# Patient Record
Sex: FEMALE | Race: WHITE | Employment: OTHER | URBAN - METROPOLITAN AREA
[De-identification: names, ages, dates, MRNs, and addresses within clinical notes are randomized per-mention and may not be internally consistent; named-entity substitution may affect disease eponyms.]

---

## 2021-04-01 PROBLEM — M79.671 RIGHT FOOT PAIN: Status: ACTIVE | Noted: 2021-04-01

## 2021-11-16 ENCOUNTER — HOSPITAL ENCOUNTER (OUTPATIENT)
Dept: PHYSICAL THERAPY | Age: 46
Discharge: HOME | End: 2021-11-16
Payer: COMMERCIAL

## 2021-11-16 PROCEDURE — 97530 THERAPEUTIC ACTIVITIES: CPT

## 2021-11-16 PROCEDURE — 97161 PT EVAL LOW COMPLEX 20 MIN: CPT

## 2021-11-16 PROCEDURE — 97110 THERAPEUTIC EXERCISES: CPT

## 2021-11-16 NOTE — PROGRESS NOTES
April Ouzts  : 1975  Primary: Keara Rodney Of [facility] Casillas*  Secondary:  [facility] at [address], [address], [place] [address].  [phone]   [phone]        OUTPATIENT PHYSICAL THERAPY: Daily Treatment Note  Visit Count:  1    ICD-10: Treatment Diagnosis: Pelvic and perineal pain (R10.2)  Muscle weakness (generalized) (M62.81)  Mixed incontinence (N39.46)  Low back pain (M54.5)    MEDICAL/REFERRING DIAGNOSIS:  Mixed incontinence [N39.46]  PFD (pelvic floor dysfunction) [M62.89]   REFERRING PHYSICIAN: Justin Horn,*  Pre-treatment Symptoms/Complaints:  See initial evaluation    Pain: Initial: 0/10 Post Session:  0/10   Medications Last Reviewed:  21   Initial Subjective and pertinent information:    PFM heaviness 3-/5 3 sec slow release  ttp bilat OI, adductors, internal endopelvic fascia,LA     Updated Objective Findings:  see eval   TREATMENT:   THERAPEUTIC EXERCISE: ( 15 minutes):  Exercises per grid below to improve strength and coordination. Required minimal verbal and tactile cues to promote proper body mechanics and promote proper body breathing techniques. Progressed resistance and repetitions as indicated.     Date:  21    Activity/Exercise Parameters   Diaphragmatic breathing With tactile feedback   drops With tactile feedback   Modified happy baby With DB               Pt ed       THERAPEUTIC ACTIVITY: ( 20 minutes): Functional activity education regarding anatomy, pathology and role of pelvic floor muscle (PFM) function in relation to presenting symptoms and role of pelvic floor therapy in conservative treatment., Instruction on coordinated pelvic floor and diaphragmatic breathing to improve kinesthetic awareness of pelvic muscle mobility and restore proper motor recruitment patterns with breathing, posture, and functional movement (e.g. appropriate lift/contraction with increased IAP such as a cough, laugh, sneeze to prevent incontinence as well as appropriate relaxation/drop to reduce pain with vaginal penetration). and briefly discussed use of squatty potty and no breath holding during bowel movement  TA Educational Topic Notes Date Completed   Pathology/Anatomy/PFM Function     Bladder health education     Urinary urge suppression     The rivka     Voiding strategies     Bowel/Bladder log     Bowel health education     Constipation care     Diarrhea/Fecal leakage     Colonic massage     Toilet positioning     Defecation dynamics     Sources of fiber     Return to intercourse/Dilator training     Sexual positioning     Lubricants/vaginal moisturizers     Vaginal irritants     Body mechanics     Posture/ergonomics     Diaphragmatic breathing     Resources and technology        MANUAL THERAPY: (  minutes):  to reduce tone and improve tissue elasticity. Pt gives verbal consent to internal vaginal assessment/treatment no chaperon present.  -   (Used abbreviations: MET - muscle energy technique; SCS- Strain counter strain; CTM- Connective tissue mobilizations; CR- Contract/relax; SP- Sustained pressure, TrP- Trigger point release, IASTM- Instrument assisted soft tissue mobilizations, TDN- Trigger point dry needling, DB - diaphragmatic breathing, PFM - pelvic floor muscles, OI - obturator internus, IC - iliococcygeus, PC - pubococcygeus, DTP - deep transverse perineum. HEP RECORD    Activity/Exercise Parameters   DB    Modified happy baby                           Treatment/Session Summary:    · Response to Treatment:  Pt reported good understanding of plan of care as well as exercises. All questions were answered and pt was invited to call with any further questions or issues.     · Communication/Consultation:  Evaluation faxed to referring provider  · Equipment provided today:  None today  · Recommendations/Intent for next treatment session: Next visit -  Hip MMT, internal MT, defecation mechanics, bladder irritants, urge suppression.   Treatment Plan of Care Effective Dates:  11/16/21 to 2/14/2022   Total Treatment Billable Duration:  35 minutes  PT Patient Time In/Time Out  Time In: 1115  Time Out: [address] [place]    Visit # Therapist initials Date Arrived NS/ Cx < 24 hr >24 hr Cx Visit Comments   1 BR 11/16/21    Initial Assessment and Treatment                                                                                                                                                            Abbreviations: NS = No Show; CX = cancelled    Future Appointments   Date Time Provider Denis Oneill   12/7/2021  9:15 AM Scotty Drew, PT [facility]

## 2021-11-16 NOTE — THERAPY EVALUATION
April Ouzts  : 1975  Primary: Chance Jaswant Of [facility] Casillas*  Secondary:  [facility] [place] [address]  [phone]   [phone]        OUTPATIENT PHYSICAL THERAPY:Initial Assessment 2021   ICD-10: Treatment Diagnosis: Pelvic and perineal pain (R10.2)  Muscle weakness (generalized) (M62.81)  Mixed incontinence (N39.46)  Low back pain (M54.5)  Precautions/Allergies:   Aspirin, Isochlor, Methergine [methylergonovine], and Zithromax [azithromycin]   TREATMENT PLAN:  Effective Dates: 2021 TO 2022 (90 days). Frequency/Duration: 1 time a week for 90 Day(s) MEDICAL/REFERRING DIAGNOSIS:  Mixed incontinence [N39.46]  PFD (pelvic floor dysfunction) [M62.89]   DATE OF ONSET: >1 year ago  REFERRING PHYSICIAN: Pierre Pérez,*  MD Orders: Evaluate and treat  Return MD Appointment: TBD     INITIAL ASSESSMENT:  Ms. Barbara Wisdom presents with increased muscle tension and guarding in PFM likely contributing to symptoms of urgency as well as leaking. Vaginal wall laxity likely contributing to difficulty with bowel movements and necessity of splinting to achieve bowel movement. Pt will benefit from physical therapy to address stated problems. Plan of care was discussed and agreed upon with patient and HEP was initiated. Thank you for the opportunity to work with this patient. PROBLEM LIST (Impacting functional limitations):  1. stress incontinence  2. urge incontinence  3. urgency  4. poor coordination  5. ttp and increased muscle tightness  6. poor water intake/ bladder habits  7. dyspareunia  8. overactive PFM  9. decreased PFM strength and endurance  INTERVENTIONS PLANNED: (Treatment may consist of any combination of the following)  1. Neuromuscular re-education  2. Biofeedback as needed  3. HEP  4. Bladder retraining  5. Bladder education  6. Electrical Stimulation  7. Home Exercise Program (HEP)  8.  Manual Therapy  9. Therapeutic Activites  10. Therapeutic Exercise/Strengthening  11. Self Care education as needed     GOALS: (Goals have been discussed and agreed upon with patient.)  Short-Term Functional Goals: Time Frame: 3 weeks  1. Pt will demonstrate I with basic PFM HEP to improve awareness, coordination, and timing of PFM. 2.  Patient will demonstrate understanding of and ability to teach back appropriate water and fiber intake, toileting frequency, and positioning for improved self-management of symptoms. Discharge Goals: Time Frame: 2/14/2022   1. Pt will demonstrate ability to voluntarily contract the  pelvic floor muscles prior to a cough or sneeze for decreased leakage during increases in intra-abdominal pressure. 2. Pt will demonstrate improvement in PFM strength to 4/5 for improved ability to combat UI. 3. Pt will improve outcome score by 75% for improved quality of life     OUTCOME MEASURE:   Tool Used: Pelvic Floor Impact Questionnaireshort form 7 (PFIQ-7)   Score:  Initial: 19.05%  · Bladder or Urine: 33.33/100  · Bowel or Rectum: 0/100  · Vagina or Pelvis: 23.81/100 Most Recent: X (Date: -- )  · Bladder or Urine: X  · Bowel or Rectum: X  · Vagina or Pelvis: X   Interpretation of Score: Each of the 7 sections is scored on a scale from 0-3; 0 representing \"Not at all\", 3 representing \"Quite a bit\". The mean value is taken from all the answered items, then multiplied by 100 to obtain the scale score, ranging from 0-100. This process is repeated for each column representing bowel, bladder, and pelvic pain. MEDICAL NECESSITY:   · Patient demonstrates good rehab potential due to higher previous functional level.   REASON FOR SERVICES/OTHER COMMENTS:  · Patient continues to require skilled intervention due to ongoing goals noted above  Total Duration:       Rehabilitation Potential For Stated Goals: Good  Regarding April Argentina's therapy, I certify that the treatment plan above will be carried out by a therapist or under their direction. Thank you for this referral,  Gina Couch, PT     Referring Physician Signature: Clifford Ball No Signature is Required for this note. PAIN/SUBJECTIVE:   Initial:   0 Post Session:  0/10   HISTORY:   History of Injury/Illness (Reason for Referral): Symptoms worsening over past year exacerbated by laughing or any stressful activity. Has difficulty  Making it to the bathroom. BM sometimes feels like it is getting stuck and not going the right direction  Pain with intercourse sometimes and other times feels numb  Reports history of chronic right lumbar pain worse with activities     Urinary: Frequency every 4-5 hours during the day. Experiences urgency every time, 0 x/night. Positive for stress urinary incontinence, urge urinary incontinence, urinary urgency and post void dribbling. Negative for urinary frequency, incomplete emptying, urinary hesitancy/intermittency, dysuria, hematuria and nocturia. Pt does use pads for protection. Wears a liner. ; 1 pads per day (PPD). Bowel: Frequency every morning. Positive for pushing/straining with bowel movement. + for splinting. Negative for pain with bowel movement and fecal incontinence. Aladdin stool type: 1, 2, 3 and 4. Use of stool softeners or laxatives? NO. Sometimes skinny stool    Fluid intake: 4 cups water/day; bladder irritants include: sodas, tea but trying to cut back. Usually one/day. Pt does not limit fluid intake due to bladder control. Sexual: Pt is sexually active. positive for dyspareunia. Not with entry but with deeper penetration and not always. Does c/o dryness. Pelvic Organ Prolapse/Pelvic Pain: Pt does report heaviness feeling. Has felt occasionally all her life. Feels everything is mushy down there. Often worse after bowel movement    OB History: Pregnancies:  4 deliveries, 1 miscarriage, 1 ,  Deliveries: all vaginal.  Does remember tearing and episiotomies.  Last delivery 13 years ago    Past Medical History/Comorbidities:   Ms. Albert Franklin  has no past medical history on file. Ms. Albert Franklin  has a past surgical history that includes hx appendectomy. Social History/Living Environment:     lives with family  Prior Level of Function/Work/Activity:  Not currently exercising  Previous Treatment Approaches:          none  Personal Factors:          Sex:  female        Age:  55 y.o.        4 prior vaginal deliveries   Ambulatory/Rehab Services H2 Model Falls Risk Assessment   Risk Factors:       No Risk Factors Identified Ability to Rise from Chair:       (0)  Ability to rise in a single movement   Falls Prevention Plan:       No modifications necessary   Total: (5 or greater = High Risk): 0   ©2010 [facility] [employer] Patent #[id number].  Federal Law prohibits the replication, distribution or use without written permission from [facility] [employer]   Current Medications:       Current Outpatient Medications:     predniSONE (STERAPRED) 5 mg dose pack, See administration instruction per 5mg dose pack, Disp: 48 Tab, Rfl: 2   Date Last Reviewed: 11/16/21    Number of Personal Factors/Comorbidities that affect the Plan of Care: 1-2: MODERATE COMPLEXITY   EXAMINATION:   RANGE OF MOTION / MOBILITY:    Lumbar ROM WFL however fulcrums mid lumbar with sidebending   Postural Assessment:  Mild increase in lumbar lordosis   Gait:  Grossly normal    External Observations:   Voluntary contraction: [] absent     [] present   Involuntary contraction: [] absent     [] present   Voluntary relaxation: [] absent     [] present   [] delayed   Perineal descent with bearing down: [] Unable to bear down   [] Present  [] Minimal   Skin integrity: normal    Pelvic Floor Muscle (PFM) Assessment:   Vaginal vault size: [] decreased     [] increased     [x] WNL   PFM tension: [] decreased     [x] increased     [] WNL   Voluntary contraction: [] absent     [x] weak [] moderate      [] Strong   Voluntary relaxation: [] absent     [x] partial     [] complete   [x] delayed   Symmetry of contraction:  symmetrical   MMT: 3-/5    PFM endurance: 3 seconds    Repetitions of endurance contractions: 5   Number of quick contractions in 10 seconds:    Quality of contractions:    Overflow: [x] absent     [] min     [] mod     [] severe / Compensatory mm groups include           Coordination:   Diaphragmatic Breathing:  Fairly good. Mild accessory mm use in chest;  Mild decrease in back body expansion   Contract-relax-bulge:   able   Use of TA:  TBD    Palpation of Pelvic Floor Muscles (PFM):  via vaginal canal  Superficial PFM (palpated externally)  Tender? Intermediate PFM Tender? Deep PFM Tender? Superficial Transverse Perineal [] Right  [] Left   Deep Transverse Perineal [] Right  [] Left   Puborectalis [x] Right  [x] Left     Ischiocavernosus [] Right  [] Left   Compressor Urethra [] Right  [] Left   Pubococcygeus [x] Right  [x] Left     Bulbocavernosus [] Right  [] Left     Ileococcygeus [x] Right  [x] Left     Obturator Internus [x] Right  [x] Left    Obturator Internus [x] Right  [x] Left     Other:    Coccygeus [] Right  [] Left         Internal endopelvic fascia ttp bilat with reproduction of urgency     External palpation:  Muscle/muscle group Tender?    Adductors [x] Right  [x] Left     Gluteals [] Right  [] Left     Piriformis [] Right  [] Left     Iliopsoas [] Right  [] Left     Abdominal wall [x] Right  [x] Left         External strength:       TBD    Special tests/Movement screens:    Diastasis recti assessment: (-)   Active Straight Leg Raise decreased stability noted bilaterally with SLR moment   Load Transfer Test positive bilateral   Q-tip test: (-)    Supine cough test:  (+)   Tissue laxity test:   Anterior wall: [x] minimum     [] moderate     [] severe      [] WNL   Posterior wall: [x] minimum     [] moderate     [] severe      [] WNL     Body Structures Involved:  1. Nerves  2. Muscles  3. Ligaments Body Functions Affected:  1. Mental  2. Sensory/Pain  3. Genitourinary  4. Neuromusculoskeletal  5. Movement Related Activities and Participation Affected:  1. Learning and Applying Knowledge  2. General Tasks and Demands  3. Mobility  4. Self Care  5.  Interpersonal Interactions and Relationships   Number of elements (examined above) that affect the Plan of Care: 3: MODERATE COMPLEXITY   CLINICAL PRESENTATION:   Presentation: Stable and uncomplicated: LOW COMPLEXITY   CLINICAL DECISION MAKING:   Use of outcome tool(s) and clinical judgement create a POC that gives a: Clear prediction of patient's progress: LOW COMPLEXITY